# Patient Record
Sex: FEMALE | URBAN - METROPOLITAN AREA
[De-identification: names, ages, dates, MRNs, and addresses within clinical notes are randomized per-mention and may not be internally consistent; named-entity substitution may affect disease eponyms.]

---

## 2021-05-27 ENCOUNTER — RECORDS - HEALTHEAST (OUTPATIENT)
Dept: ADMINISTRATIVE | Facility: CLINIC | Age: 71
End: 2021-05-27

## 2024-08-09 ENCOUNTER — HOSPITAL ENCOUNTER (OUTPATIENT)
Facility: CLINIC | Age: 74
End: 2024-08-09
Attending: OBSTETRICS & GYNECOLOGY | Admitting: OBSTETRICS & GYNECOLOGY
Payer: MEDICARE

## 2024-10-01 ENCOUNTER — LAB REQUISITION (OUTPATIENT)
Dept: LAB | Facility: CLINIC | Age: 74
End: 2024-10-01
Payer: MEDICARE

## 2024-10-01 DIAGNOSIS — N84.1 POLYP OF CERVIX UTERI: ICD-10-CM

## 2024-10-01 DIAGNOSIS — Z12.4 ENCOUNTER FOR SCREENING FOR MALIGNANT NEOPLASM OF CERVIX: ICD-10-CM

## 2024-10-01 PROCEDURE — 87624 HPV HI-RISK TYP POOLED RSLT: CPT | Mod: ORL | Performed by: OBSTETRICS & GYNECOLOGY

## 2024-10-01 PROCEDURE — G0145 SCR C/V CYTO,THINLAYER,RESCR: HCPCS | Mod: ORL | Performed by: OBSTETRICS & GYNECOLOGY

## 2024-10-01 PROCEDURE — 88305 TISSUE EXAM BY PATHOLOGIST: CPT | Mod: TC,ORL | Performed by: OBSTETRICS & GYNECOLOGY

## 2024-10-02 LAB
HPV HR 12 DNA CVX QL NAA+PROBE: NEGATIVE
HPV16 DNA CVX QL NAA+PROBE: NEGATIVE
HPV18 DNA CVX QL NAA+PROBE: NEGATIVE
HUMAN PAPILLOMA VIRUS FINAL DIAGNOSIS: NORMAL

## 2024-10-03 LAB
PATH REPORT.COMMENTS IMP SPEC: NORMAL
PATH REPORT.COMMENTS IMP SPEC: NORMAL
PATH REPORT.FINAL DX SPEC: NORMAL
PATH REPORT.GROSS SPEC: NORMAL
PATH REPORT.MICROSCOPIC SPEC OTHER STN: NORMAL
PATH REPORT.RELEVANT HX SPEC: NORMAL
PHOTO IMAGE: NORMAL

## 2024-10-03 PROCEDURE — 88305 TISSUE EXAM BY PATHOLOGIST: CPT | Mod: 26 | Performed by: STUDENT IN AN ORGANIZED HEALTH CARE EDUCATION/TRAINING PROGRAM

## 2024-10-04 LAB
BKR AP ASSOCIATED HPV REPORT: NORMAL
BKR LAB AP GYN ADEQUACY: NORMAL
BKR LAB AP GYN INTERPRETATION: NORMAL
BKR LAB AP LMP: NORMAL
BKR LAB AP PREVIOUS ABNL DX: NORMAL
BKR LAB AP PREVIOUS ABNORMAL: NORMAL
PATH REPORT.COMMENTS IMP SPEC: NORMAL
PATH REPORT.COMMENTS IMP SPEC: NORMAL
PATH REPORT.RELEVANT HX SPEC: NORMAL

## 2024-12-08 RX ORDER — ACETAMINOPHEN 325 MG/1
975 TABLET ORAL ONCE
Status: CANCELLED | OUTPATIENT
Start: 2024-12-08 | End: 2024-12-08

## 2024-12-08 RX ORDER — CEFAZOLIN SODIUM/WATER 2 G/20 ML
2 SYRINGE (ML) INTRAVENOUS SEE ADMIN INSTRUCTIONS
Status: CANCELLED | OUTPATIENT
Start: 2024-12-08

## 2024-12-08 RX ORDER — CEFAZOLIN SODIUM/WATER 2 G/20 ML
2 SYRINGE (ML) INTRAVENOUS
Status: CANCELLED | OUTPATIENT
Start: 2024-12-08

## 2025-01-16 NOTE — H&P (VIEW-ONLY)
Pre-Operative Assessment        1/16/2025   Pre-Op Assessment Procedure Details   Procedure Prolapsed bladder, hysterectomy   Surgeon Dr. Abdullahi   Location Other   Other Location Name [facility]   Procedure Date 1/30/2025        Tony Umana is a 74 y.o. old female here for pre-operative evaluation for procedure noted above.     Patient has had nearly 1 years history of pelvic/vaginal bulge sensation. Since fall of 2024 she has sought UroGyne/women's health consultation. Part of this evaluation did include pelvic ultrasound, urodynamic testing with recommendation to complete laparoscopic supracervical hysterectomy, robot assisted, sacral colpoperineopexy, bilateral salpingo oophorectomy, and tension-free vaginal tape.    history of remote breast cancer 20+ years ago; this was treated s/p bilateral mastectomy.  Patient also has a history of hypertension currently on losartan and HCTZ regimen.  History of left TKA 05/26/2023.     Family history of malignant neoplasm of the GI tract; patient's last colonoscopy 12/2022 with advised 3 year repeat.    Patient Active Problem List    Diagnosis Date Noted     Cystocele with uterine prolapse 04/22/2024     H/O adenomatous polyp of colon 05/12/2023     Overview Note:     Last scope 2022 w/tubular adenoma.       Essential hypertension, benign ([facility]) 05/12/2023     Stiffness of finger joint of right hand 05/28/2021     Hypertrophic burn scar 05/28/2021     Mild intermittent asthma without complication ([facility]) 02/23/2016     Family history of malignant neoplasm of gastrointestinal tract 06/07/2007     History of breast cancer 02/15/2007     Overview Note:     2003    S/p elysia mastectomy.  no chemo or radiation        Past Medical History:   Diagnosis Date     Arthritis      Asthma      Breast cancer ([facility])      Hypertension ([facility])       Past Surgical History:   Procedure Laterality Date     CHOLECYSTECTOMY       MASTECTOMY Bilateral       Current Outpatient Medications    Medication Instructions     albuterol 2.5 mg/3 mL (0.083%) (PROVENTIL) 2.5 mg, Inhalation, Q6H PRN     ALBUterol sulfate  (90 Base) MCG/ACT inhaler 1-2 Puffs, Inhalation, Q4H PRN     Cholecalciferol (VITAMIN D-3 OR) 5,000 Units, Oral, DAILY     drug not in computer Ligaplex !!-one capsule daily     fluticasone (FLOVENT HFA) 110 mcg/actuation inhaler 1 Puff, BID     hydroCHLOROthiazide (ORETIC) 25 mg, Oral, DAILY     loratadine (CLARITIN) 10 mg, Oral, DAILY     losartan (COZAAR) 25 mg, Oral, DAILY     zinc gluconate 50 mg, Oral, DAILY     Allergies   Allergen Reactions     Latex Unknown     Atenolol Unknown     Azithromycin Gastrointestinal     Lisinopril Unknown     Social History     Occupational History     Not on file   Tobacco Use     Smoking status: Never     Smokeless tobacco: Never   Vaping Use     Vaping status: Never Used   Substance and Sexual Activity     Alcohol use: Not Currently     Drug use: Not on file     Sexual activity: Not on file       No LMP recorded.    Family History   Problem Relation Name Age of Onset     Cancer, Colon Birth Father       Review of Systems:        1/16/2025   ET Amb PreOp Assessment Sx   Have you had a heart attack in the last 30 days? No   Have you experienced chest tightening or chest pressure with activity? No   Do you wake at night with difficulty breathing? No   Do you have swelling in your feet or ankles? No   Do you get short of breath if lying flat at night? No   Do you hear wheezing or whistling when you breathe? No   Have you had a cough, runny nose, or cold symptoms in the last 2 weeks? No   Have you tested positive for Covid in the last 6 months? No   Do you have a long-standing cough? No   Do you snore or are you sleepy during the day? No   Do you have any symptoms due to a recent concussion? No   Do you or close relatives have bleeding or clotting problems? No   Have you taken Aspirin, Ibuprofen (Advil) or Naproxen (Aleve) in the last 7 days? No   Do  you or close relatives have a history of a severe or life-threatening reaction to anesthesia? No        Estimated Functional Capacity  Can you climb one flight of stairs, or walk up a gradual uphill without stopping? yes, functional capacity is more than or equal to 4 METS    Objective   /83 (BP Location: Right Arm, BP Cuff Size: Large)   Pulse 64   Temp 97.9  F (36.6  C) (Temporal Artery)   Resp 16   Wt 214 lb (97.1 kg)   SpO2 98%     Physical Exam:  General Appearance: alert, well appearing, and in no apparent distress  Eyes:  sclera clear and conjunctiva normal  ENT:  oropharynx clear, ear canals clear, TMs normal, and Mallampati III  Neck:  no lymphadenopathy, no thyromegaly or nodules, and carotids pulses normal and without bruits  Heart:  regular rate and rhythm and no murmurs, gallops or rubs  Lungs:  clear to auscultation and no wheezes, rales or rhonchi  Abdomen:  soft, nontender, no palpable masses, and normal bowel sounds  Extremities:  no edema and normal pedal pulses  Skin:  no rashes or worrisome lesions  Neurologic:  normal speech, no facial droop, alert and oriented x 3, and normal gait    Data:      Labs: Yes:   Sodium   Date Value Ref Range Status   01/16/2025 137 136 - 145 mmol/L Final     Potassium   Date Value Ref Range Status   01/16/2025 3.9 3.5 - 5.1 mmol/L Final     Creatinine   Date Value Ref Range Status   01/16/2025 0.80 0.55 - 1.02 mg/dL Final     Hemoglobin   Date Value Ref Range Status   01/16/2025 12.7 12.0 - 15.5 g/dL Final     ECG: Today: 1/16/2025.   Sinus rhythm with sinus arrhythmia  Minimal voltage criteria for LVH, may be normal variant  Borderline ECG  No previous ECGs available     Assessment/Plan   Patient is medically optimized for planned procedure(s).      ICD-10-CM    1. Preop general physical exam  Z01.818 EKG Reading/Trace     Sodium     Potassium     Creatinine / GFR     Complete Blood Count-No Diff     Sodium     Potassium     Creatinine / GFR     Complete  Blood Count-No Diff      2. Female genital prolapse, unspecified type  N81.9       3. Uterovaginal prolapse  N81.4       4. Female stress incontinence  N39.3         Special risks:  Mild asthma history  Hypertension in which her antihypertensives will be held the day of the procedure    Medication recommendations:    Patient Instructions   losartan (COZAAR) 25 MG tablet [[id number]]  -  Do not take on the morning of procedure.     hydroCHLOROthiazide (ORETIC) 25 MG tablet [[id number]]  -  Do not take on the morning of procedure.     Follow your individualized medication recommendations as described above.    Stop ibuprofen 1 day before surgery  Stop naproxen (Aleve) 4 days before surgery  Stop aspirin 7 days before surgery  Stop vitamins, herbal supplements 14 days prior to surgery (vitamin D, zinc & your Ligaplex)    Continue all other medications as currently taking--albuterol neb or inhaler as needed, Flovent inhaler as scheduled, and loratadine (Claritin).   Let your care team know if you have questions.    On the day of your procedure, do not wear any hair product including hair sprays and gels and avoid using body sprays and deodorants/antiperspirants.    Bring with you to the site of the procedure:    Any oral appliances or CPAP equipment related to sleep apnea  Any other health-related equipment or devices you use daily    Electronically signed by: SUDEEP Tejada, CNP  1/16/2025, 3:05 PM

## 2025-01-27 RX ORDER — VITAMIN B COMPLEX
1 TABLET ORAL DAILY
Status: ON HOLD | COMMUNITY
Start: 2023-05-12

## 2025-01-27 RX ORDER — LOSARTAN POTASSIUM 25 MG/1
1 TABLET ORAL DAILY
Status: ON HOLD | COMMUNITY
Start: 2024-05-31

## 2025-01-27 RX ORDER — HYDROCHLOROTHIAZIDE 25 MG/1
1 TABLET ORAL DAILY
Status: ON HOLD | COMMUNITY
Start: 2024-05-31

## 2025-01-27 RX ORDER — LORATADINE 10 MG/1
10 TABLET ORAL DAILY
Status: ON HOLD | COMMUNITY

## 2025-01-30 ENCOUNTER — ANESTHESIA (OUTPATIENT)
Dept: SURGERY | Facility: CLINIC | Age: 75
End: 2025-01-30
Payer: MEDICARE

## 2025-01-30 ENCOUNTER — ANESTHESIA EVENT (OUTPATIENT)
Dept: SURGERY | Facility: CLINIC | Age: 75
End: 2025-01-30
Payer: MEDICARE

## 2025-01-30 ENCOUNTER — HOSPITAL ENCOUNTER (OUTPATIENT)
Facility: CLINIC | Age: 75
End: 2025-01-30
Attending: OBSTETRICS & GYNECOLOGY | Admitting: OBSTETRICS & GYNECOLOGY
Payer: MEDICARE

## 2025-01-30 VITALS
TEMPERATURE: 98.7 F | DIASTOLIC BLOOD PRESSURE: 72 MMHG | OXYGEN SATURATION: 96 % | HEART RATE: 80 BPM | RESPIRATION RATE: 16 BRPM | WEIGHT: 214 LBS | HEIGHT: 63 IN | SYSTOLIC BLOOD PRESSURE: 153 MMHG | BODY MASS INDEX: 37.92 KG/M2

## 2025-01-30 DIAGNOSIS — Z90.710 H/O: HYSTERECTOMY: Primary | ICD-10-CM

## 2025-01-30 LAB
ABO + RH BLD: NORMAL
BASOPHILS # BLD AUTO: 0 10E3/UL (ref 0–0.2)
BASOPHILS NFR BLD AUTO: 0 %
BLD GP AB SCN SERPL QL: NEGATIVE
EOSINOPHIL # BLD AUTO: 0.3 10E3/UL (ref 0–0.7)
EOSINOPHIL NFR BLD AUTO: 4 %
ERYTHROCYTE [DISTWIDTH] IN BLOOD BY AUTOMATED COUNT: 14 % (ref 10–15)
HCT VFR BLD AUTO: 37.7 % (ref 35–47)
HGB BLD-MCNC: 12.4 G/DL (ref 11.7–15.7)
IMM GRANULOCYTES # BLD: 0 10E3/UL
IMM GRANULOCYTES NFR BLD: 0 %
LYMPHOCYTES # BLD AUTO: 3.5 10E3/UL (ref 0.8–5.3)
LYMPHOCYTES NFR BLD AUTO: 40 %
MCH RBC QN AUTO: 28.8 PG (ref 26.5–33)
MCHC RBC AUTO-ENTMCNC: 32.9 G/DL (ref 31.5–36.5)
MCV RBC AUTO: 88 FL (ref 78–100)
MONOCYTES # BLD AUTO: 0.8 10E3/UL (ref 0–1.3)
MONOCYTES NFR BLD AUTO: 9 %
NEUTROPHILS # BLD AUTO: 4.1 10E3/UL (ref 1.6–8.3)
NEUTROPHILS NFR BLD AUTO: 47 %
NRBC # BLD AUTO: 0 10E3/UL
NRBC BLD AUTO-RTO: 0 /100
PLATELET # BLD AUTO: 316 10E3/UL (ref 150–450)
RBC # BLD AUTO: 4.31 10E6/UL (ref 3.8–5.2)
SPECIMEN EXP DATE BLD: NORMAL
WBC # BLD AUTO: 8.8 10E3/UL (ref 4–11)

## 2025-01-30 PROCEDURE — 250N000011 HC RX IP 250 OP 636: Performed by: OBSTETRICS & GYNECOLOGY

## 2025-01-30 PROCEDURE — 85018 HEMOGLOBIN: CPT | Performed by: NURSE PRACTITIONER

## 2025-01-30 PROCEDURE — 86850 RBC ANTIBODY SCREEN: CPT | Performed by: NURSE PRACTITIONER

## 2025-01-30 PROCEDURE — 250N000011 HC RX IP 250 OP 636: Performed by: NURSE ANESTHETIST, CERTIFIED REGISTERED

## 2025-01-30 PROCEDURE — 258N000003 HC RX IP 258 OP 636: Performed by: ANESTHESIOLOGY

## 2025-01-30 PROCEDURE — 250N000009 HC RX 250: Performed by: NURSE ANESTHETIST, CERTIFIED REGISTERED

## 2025-01-30 PROCEDURE — 250N000009 HC RX 250: Performed by: OBSTETRICS & GYNECOLOGY

## 2025-01-30 PROCEDURE — 99203 OFFICE O/P NEW LOW 30 MIN: CPT | Performed by: EMERGENCY MEDICINE

## 2025-01-30 PROCEDURE — 250N000013 HC RX MED GY IP 250 OP 250 PS 637: Performed by: OBSTETRICS & GYNECOLOGY

## 2025-01-30 PROCEDURE — C1771 REP DEV, URINARY, W/SLING: HCPCS | Performed by: OBSTETRICS & GYNECOLOGY

## 2025-01-30 PROCEDURE — 272N000001 HC OR GENERAL SUPPLY STERILE: Performed by: OBSTETRICS & GYNECOLOGY

## 2025-01-30 PROCEDURE — 360N000080 HC SURGERY LEVEL 7, PER MIN: Performed by: OBSTETRICS & GYNECOLOGY

## 2025-01-30 PROCEDURE — 258N000001 HC RX 258: Performed by: OBSTETRICS & GYNECOLOGY

## 2025-01-30 PROCEDURE — 370N000017 HC ANESTHESIA TECHNICAL FEE, PER MIN: Performed by: OBSTETRICS & GYNECOLOGY

## 2025-01-30 PROCEDURE — 250N000011 HC RX IP 250 OP 636: Performed by: NURSE PRACTITIONER

## 2025-01-30 PROCEDURE — 85004 AUTOMATED DIFF WBC COUNT: CPT | Performed by: NURSE PRACTITIONER

## 2025-01-30 PROCEDURE — 250N000025 HC SEVOFLURANE, PER MIN: Performed by: OBSTETRICS & GYNECOLOGY

## 2025-01-30 PROCEDURE — 36415 COLL VENOUS BLD VENIPUNCTURE: CPT | Performed by: NURSE PRACTITIONER

## 2025-01-30 PROCEDURE — 999N000141 HC STATISTIC PRE-PROCEDURE NURSING ASSESSMENT: Performed by: OBSTETRICS & GYNECOLOGY

## 2025-01-30 PROCEDURE — 86900 BLOOD TYPING SEROLOGIC ABO: CPT | Performed by: NURSE PRACTITIONER

## 2025-01-30 PROCEDURE — C1781 MESH (IMPLANTABLE): HCPCS | Performed by: OBSTETRICS & GYNECOLOGY

## 2025-01-30 PROCEDURE — 88302 TISSUE EXAM BY PATHOLOGIST: CPT | Mod: TC | Performed by: OBSTETRICS & GYNECOLOGY

## 2025-01-30 PROCEDURE — 258N000003 HC RX IP 258 OP 636: Performed by: NURSE ANESTHETIST, CERTIFIED REGISTERED

## 2025-01-30 PROCEDURE — 710N000010 HC RECOVERY PHASE 1, LEVEL 2, PER MIN: Performed by: OBSTETRICS & GYNECOLOGY

## 2025-01-30 PROCEDURE — 250N000013 HC RX MED GY IP 250 OP 250 PS 637: Performed by: NURSE PRACTITIONER

## 2025-01-30 PROCEDURE — 85041 AUTOMATED RBC COUNT: CPT | Performed by: NURSE PRACTITIONER

## 2025-01-30 DEVICE — IMPLANTABLE DEVICE: Type: IMPLANTABLE DEVICE | Site: VAGINA | Status: FUNCTIONAL

## 2025-01-30 DEVICE — POLYPROPYLENE MESH FOR SACROCOLPOSUSPENSION/SACROCOLPOPEXY - Y CONTOUR™
Type: IMPLANTABLE DEVICE | Site: ABDOMEN | Status: FUNCTIONAL
Brand: RESTORELLE

## 2025-01-30 RX ORDER — FENTANYL CITRATE 50 UG/ML
25 INJECTION, SOLUTION INTRAMUSCULAR; INTRAVENOUS EVERY 5 MIN PRN
Status: DISCONTINUED | OUTPATIENT
Start: 2025-01-30 | End: 2025-01-30 | Stop reason: HOSPADM

## 2025-01-30 RX ORDER — AMOXICILLIN 250 MG
1-2 CAPSULE ORAL 2 TIMES DAILY
Qty: 30 TABLET | Refills: 0 | Status: SHIPPED | OUTPATIENT
Start: 2025-01-30

## 2025-01-30 RX ORDER — AMOXICILLIN 250 MG
1 CAPSULE ORAL 2 TIMES DAILY
Status: DISPENSED | OUTPATIENT
Start: 2025-01-30

## 2025-01-30 RX ORDER — OXYCODONE HYDROCHLORIDE 5 MG/1
5 TABLET ORAL EVERY 4 HOURS PRN
Status: ACTIVE | OUTPATIENT
Start: 2025-01-30

## 2025-01-30 RX ORDER — SODIUM CHLORIDE, SODIUM LACTATE, POTASSIUM CHLORIDE, CALCIUM CHLORIDE 600; 310; 30; 20 MG/100ML; MG/100ML; MG/100ML; MG/100ML
INJECTION, SOLUTION INTRAVENOUS CONTINUOUS
Status: DISCONTINUED | OUTPATIENT
Start: 2025-01-30 | End: 2025-01-30 | Stop reason: HOSPADM

## 2025-01-30 RX ORDER — ACETAMINOPHEN 325 MG/1
975 TABLET ORAL ONCE
Status: COMPLETED | OUTPATIENT
Start: 2025-01-30 | End: 2025-01-30

## 2025-01-30 RX ORDER — LIDOCAINE HYDROCHLORIDE 10 MG/ML
INJECTION, SOLUTION INFILTRATION; PERINEURAL PRN
Status: DISCONTINUED | OUTPATIENT
Start: 2025-01-30 | End: 2025-01-30

## 2025-01-30 RX ORDER — ONDANSETRON 2 MG/ML
4 INJECTION INTRAMUSCULAR; INTRAVENOUS EVERY 30 MIN PRN
Status: DISCONTINUED | OUTPATIENT
Start: 2025-01-30 | End: 2025-01-30 | Stop reason: HOSPADM

## 2025-01-30 RX ORDER — NALOXONE HYDROCHLORIDE 0.4 MG/ML
0.4 INJECTION, SOLUTION INTRAMUSCULAR; INTRAVENOUS; SUBCUTANEOUS
Status: ACTIVE | OUTPATIENT
Start: 2025-01-30

## 2025-01-30 RX ORDER — NALOXONE HYDROCHLORIDE 0.4 MG/ML
0.1 INJECTION, SOLUTION INTRAMUSCULAR; INTRAVENOUS; SUBCUTANEOUS
Status: DISCONTINUED | OUTPATIENT
Start: 2025-01-30 | End: 2025-01-30 | Stop reason: HOSPADM

## 2025-01-30 RX ORDER — HYDROMORPHONE HCL IN WATER/PF 6 MG/30 ML
0.2 PATIENT CONTROLLED ANALGESIA SYRINGE INTRAVENOUS EVERY 5 MIN PRN
Status: DISCONTINUED | OUTPATIENT
Start: 2025-01-30 | End: 2025-01-30 | Stop reason: HOSPADM

## 2025-01-30 RX ORDER — ZINC SULFATE 50(220)MG
220 CAPSULE ORAL DAILY
Status: ON HOLD | COMMUNITY

## 2025-01-30 RX ORDER — BUPIVACAINE HYDROCHLORIDE 2.5 MG/ML
INJECTION, SOLUTION INFILTRATION; PERINEURAL
Status: DISCONTINUED
Start: 2025-01-30 | End: 2025-01-30 | Stop reason: HOSPADM

## 2025-01-30 RX ORDER — PROCHLORPERAZINE MALEATE 5 MG/1
5 TABLET ORAL EVERY 6 HOURS PRN
Status: ACTIVE | OUTPATIENT
Start: 2025-01-30

## 2025-01-30 RX ORDER — OXYCODONE HYDROCHLORIDE 5 MG/1
10 TABLET ORAL EVERY 4 HOURS PRN
Status: ACTIVE | OUTPATIENT
Start: 2025-01-30

## 2025-01-30 RX ORDER — ONDANSETRON 4 MG/1
4-8 TABLET, ORALLY DISINTEGRATING ORAL EVERY 8 HOURS PRN
Qty: 4 TABLET | Refills: 0 | Status: SHIPPED | OUTPATIENT
Start: 2025-01-30

## 2025-01-30 RX ORDER — NALOXONE HYDROCHLORIDE 0.4 MG/ML
0.2 INJECTION, SOLUTION INTRAMUSCULAR; INTRAVENOUS; SUBCUTANEOUS
Status: ACTIVE | OUTPATIENT
Start: 2025-01-30

## 2025-01-30 RX ORDER — KETOROLAC TROMETHAMINE 15 MG/ML
15 INJECTION, SOLUTION INTRAMUSCULAR; INTRAVENOUS EVERY 6 HOURS
Status: ACTIVE | OUTPATIENT
Start: 2025-01-31 | End: 2025-02-05

## 2025-01-30 RX ORDER — ONDANSETRON 2 MG/ML
INJECTION INTRAMUSCULAR; INTRAVENOUS PRN
Status: DISCONTINUED | OUTPATIENT
Start: 2025-01-30 | End: 2025-01-30

## 2025-01-30 RX ORDER — POLYETHYLENE GLYCOL 3350 17 G/17G
17 POWDER, FOR SOLUTION ORAL DAILY PRN
Status: ACTIVE | OUTPATIENT
Start: 2025-01-31

## 2025-01-30 RX ORDER — FENTANYL CITRATE 50 UG/ML
50 INJECTION, SOLUTION INTRAMUSCULAR; INTRAVENOUS EVERY 5 MIN PRN
Status: DISCONTINUED | OUTPATIENT
Start: 2025-01-30 | End: 2025-01-30 | Stop reason: HOSPADM

## 2025-01-30 RX ORDER — LOSARTAN POTASSIUM 25 MG/1
25 TABLET ORAL DAILY
Status: ACTIVE | OUTPATIENT
Start: 2025-01-31

## 2025-01-30 RX ORDER — HYDROMORPHONE HCL IN WATER/PF 6 MG/30 ML
0.2 PATIENT CONTROLLED ANALGESIA SYRINGE INTRAVENOUS EVERY 4 HOURS PRN
Status: DISCONTINUED | OUTPATIENT
Start: 2025-01-30 | End: 2025-01-30

## 2025-01-30 RX ORDER — DEXAMETHASONE SODIUM PHOSPHATE 4 MG/ML
4 INJECTION, SOLUTION INTRA-ARTICULAR; INTRALESIONAL; INTRAMUSCULAR; INTRAVENOUS; SOFT TISSUE
Status: DISCONTINUED | OUTPATIENT
Start: 2025-01-30 | End: 2025-01-30 | Stop reason: HOSPADM

## 2025-01-30 RX ORDER — ACETAMINOPHEN 325 MG/1
650 TABLET ORAL EVERY 6 HOURS
Status: ACTIVE | OUTPATIENT
Start: 2025-02-03

## 2025-01-30 RX ORDER — HYDROMORPHONE HYDROCHLORIDE 1 MG/ML
0.5 INJECTION, SOLUTION INTRAMUSCULAR; INTRAVENOUS; SUBCUTANEOUS EVERY 4 HOURS PRN
Status: ACTIVE | OUTPATIENT
Start: 2025-01-30

## 2025-01-30 RX ORDER — CEFAZOLIN SODIUM/WATER 2 G/20 ML
2 SYRINGE (ML) INTRAVENOUS
Status: COMPLETED | OUTPATIENT
Start: 2025-01-30 | End: 2025-01-30

## 2025-01-30 RX ORDER — OXYCODONE HYDROCHLORIDE 5 MG/1
5-10 TABLET ORAL EVERY 4 HOURS PRN
Qty: 12 TABLET | Refills: 0 | Status: SHIPPED | OUTPATIENT
Start: 2025-01-30

## 2025-01-30 RX ORDER — HYDROXYZINE HYDROCHLORIDE 10 MG/1
10 TABLET, FILM COATED ORAL EVERY 6 HOURS PRN
Status: ACTIVE | OUTPATIENT
Start: 2025-01-30

## 2025-01-30 RX ORDER — GLYCOPYRROLATE 0.2 MG/ML
INJECTION, SOLUTION INTRAMUSCULAR; INTRAVENOUS PRN
Status: DISCONTINUED | OUTPATIENT
Start: 2025-01-30 | End: 2025-01-30

## 2025-01-30 RX ORDER — IBUPROFEN 600 MG/1
600 TABLET, FILM COATED ORAL EVERY 6 HOURS
Status: ACTIVE | OUTPATIENT
Start: 2025-01-31 | End: 2025-02-05

## 2025-01-30 RX ORDER — FENTANYL CITRATE 50 UG/ML
INJECTION, SOLUTION INTRAMUSCULAR; INTRAVENOUS PRN
Status: DISCONTINUED | OUTPATIENT
Start: 2025-01-30 | End: 2025-01-30

## 2025-01-30 RX ORDER — LIDOCAINE 40 MG/G
CREAM TOPICAL
Status: ACTIVE | OUTPATIENT
Start: 2025-01-30

## 2025-01-30 RX ORDER — ONDANSETRON 2 MG/ML
4 INJECTION INTRAMUSCULAR; INTRAVENOUS EVERY 6 HOURS PRN
Status: ACTIVE | OUTPATIENT
Start: 2025-01-30

## 2025-01-30 RX ORDER — FUROSEMIDE 10 MG/ML
INJECTION INTRAMUSCULAR; INTRAVENOUS PRN
Status: DISCONTINUED | OUTPATIENT
Start: 2025-01-30 | End: 2025-01-30

## 2025-01-30 RX ORDER — PROPOFOL 10 MG/ML
INJECTION, EMULSION INTRAVENOUS PRN
Status: DISCONTINUED | OUTPATIENT
Start: 2025-01-30 | End: 2025-01-30

## 2025-01-30 RX ORDER — LIDOCAINE HYDROCHLORIDE AND EPINEPHRINE 10; 10 MG/ML; UG/ML
INJECTION, SOLUTION INFILTRATION; PERINEURAL PRN
Status: DISCONTINUED | OUTPATIENT
Start: 2025-01-30 | End: 2025-01-30 | Stop reason: HOSPADM

## 2025-01-30 RX ORDER — HYDROMORPHONE HCL IN WATER/PF 6 MG/30 ML
0.4 PATIENT CONTROLLED ANALGESIA SYRINGE INTRAVENOUS EVERY 5 MIN PRN
Status: DISCONTINUED | OUTPATIENT
Start: 2025-01-30 | End: 2025-01-30 | Stop reason: HOSPADM

## 2025-01-30 RX ORDER — ACETAMINOPHEN 325 MG/1
975 TABLET ORAL EVERY 6 HOURS
Status: ACTIVE | OUTPATIENT
Start: 2025-01-31 | End: 2025-02-03

## 2025-01-30 RX ORDER — ONDANSETRON 4 MG/1
4 TABLET, ORALLY DISINTEGRATING ORAL EVERY 30 MIN PRN
Status: DISCONTINUED | OUTPATIENT
Start: 2025-01-30 | End: 2025-01-30 | Stop reason: HOSPADM

## 2025-01-30 RX ORDER — LIDOCAINE HYDROCHLORIDE AND EPINEPHRINE 10; 10 MG/ML; UG/ML
INJECTION, SOLUTION INFILTRATION; PERINEURAL
Status: DISCONTINUED
Start: 2025-01-30 | End: 2025-01-30 | Stop reason: HOSPADM

## 2025-01-30 RX ORDER — LORATADINE 10 MG/1
10 TABLET ORAL DAILY
Status: ACTIVE | OUTPATIENT
Start: 2025-01-31

## 2025-01-30 RX ORDER — HYDROCHLOROTHIAZIDE 25 MG/1
25 TABLET ORAL DAILY
Status: ACTIVE | OUTPATIENT
Start: 2025-02-01

## 2025-01-30 RX ORDER — PROPOFOL 10 MG/ML
INJECTION, EMULSION INTRAVENOUS CONTINUOUS PRN
Status: DISCONTINUED | OUTPATIENT
Start: 2025-01-30 | End: 2025-01-30

## 2025-01-30 RX ORDER — BISACODYL 10 MG
10 SUPPOSITORY, RECTAL RECTAL DAILY PRN
Status: ACTIVE | OUTPATIENT
Start: 2025-01-30

## 2025-01-30 RX ORDER — SODIUM PHOSPHATE,MONO-DIBASIC 19G-7G/118
1 ENEMA (ML) RECTAL
Status: ACTIVE | OUTPATIENT
Start: 2025-01-30

## 2025-01-30 RX ORDER — ACETAMINOPHEN 325 MG/1
975 TABLET ORAL EVERY 6 HOURS PRN
Qty: 50 TABLET | Refills: 0 | Status: SHIPPED | OUTPATIENT
Start: 2025-01-30

## 2025-01-30 RX ORDER — ONDANSETRON 4 MG/1
4 TABLET, ORALLY DISINTEGRATING ORAL EVERY 6 HOURS PRN
Status: ACTIVE | OUTPATIENT
Start: 2025-01-30

## 2025-01-30 RX ORDER — CEFAZOLIN SODIUM/WATER 2 G/20 ML
2 SYRINGE (ML) INTRAVENOUS SEE ADMIN INSTRUCTIONS
Status: DISCONTINUED | OUTPATIENT
Start: 2025-01-30 | End: 2025-01-30 | Stop reason: HOSPADM

## 2025-01-30 RX ORDER — DEXAMETHASONE SODIUM PHOSPHATE 10 MG/ML
INJECTION, SOLUTION INTRAMUSCULAR; INTRAVENOUS PRN
Status: DISCONTINUED | OUTPATIENT
Start: 2025-01-30 | End: 2025-01-30

## 2025-01-30 RX ORDER — HYDROMORPHONE HYDROCHLORIDE 1 MG/ML
0.3 INJECTION, SOLUTION INTRAMUSCULAR; INTRAVENOUS; SUBCUTANEOUS EVERY 4 HOURS PRN
Status: ACTIVE | OUTPATIENT
Start: 2025-01-30

## 2025-01-30 RX ORDER — METRONIDAZOLE 500 MG/100ML
500 INJECTION, SOLUTION INTRAVENOUS
Status: COMPLETED | OUTPATIENT
Start: 2025-01-30 | End: 2025-01-30

## 2025-01-30 RX ORDER — HYDROMORPHONE HCL IN WATER/PF 6 MG/30 ML
0.1 PATIENT CONTROLLED ANALGESIA SYRINGE INTRAVENOUS EVERY 4 HOURS PRN
Status: DISCONTINUED | OUTPATIENT
Start: 2025-01-30 | End: 2025-01-30

## 2025-01-30 RX ORDER — IBUPROFEN 600 MG/1
600 TABLET, FILM COATED ORAL EVERY 6 HOURS PRN
Qty: 30 TABLET | Refills: 0 | Status: SHIPPED | OUTPATIENT
Start: 2025-01-30

## 2025-01-30 RX ADMIN — DEXMEDETOMIDINE HYDROCHLORIDE 12 MCG: 100 INJECTION, SOLUTION INTRAVENOUS at 17:22

## 2025-01-30 RX ADMIN — MIDAZOLAM 2 MG: 1 INJECTION INTRAMUSCULAR; INTRAVENOUS at 14:26

## 2025-01-30 RX ADMIN — ROCURONIUM BROMIDE 20 MG: 10 INJECTION, SOLUTION INTRAVENOUS at 15:54

## 2025-01-30 RX ADMIN — ROCURONIUM BROMIDE 50 MG: 10 INJECTION, SOLUTION INTRAVENOUS at 14:35

## 2025-01-30 RX ADMIN — DEXMEDETOMIDINE HYDROCHLORIDE 8 MCG: 100 INJECTION, SOLUTION INTRAVENOUS at 16:01

## 2025-01-30 RX ADMIN — FENTANYL CITRATE 100 MCG: 50 INJECTION INTRAMUSCULAR; INTRAVENOUS at 14:35

## 2025-01-30 RX ADMIN — ONDANSETRON 4 MG: 2 INJECTION INTRAMUSCULAR; INTRAVENOUS at 16:48

## 2025-01-30 RX ADMIN — HYDROMORPHONE HYDROCHLORIDE 0.5 MG: 1 INJECTION, SOLUTION INTRAMUSCULAR; INTRAVENOUS; SUBCUTANEOUS at 16:22

## 2025-01-30 RX ADMIN — SODIUM CHLORIDE, POTASSIUM CHLORIDE, SODIUM LACTATE AND CALCIUM CHLORIDE: 600; 310; 30; 20 INJECTION, SOLUTION INTRAVENOUS at 17:26

## 2025-01-30 RX ADMIN — METRONIDAZOLE 500 MG: 5 INJECTION, SOLUTION INTRAVENOUS at 13:15

## 2025-01-30 RX ADMIN — LIDOCAINE HYDROCHLORIDE 5 ML: 10 INJECTION, SOLUTION INFILTRATION; PERINEURAL at 14:35

## 2025-01-30 RX ADMIN — Medication 2 G: at 14:26

## 2025-01-30 RX ADMIN — FLUORESCEIN SODIUM 1 ML: 100 INJECTION INTRAVENOUS at 17:06

## 2025-01-30 RX ADMIN — Medication 200 MG: at 17:28

## 2025-01-30 RX ADMIN — GLYCOPYRROLATE 0.2 MG: 0.2 INJECTION INTRAMUSCULAR; INTRAVENOUS at 14:59

## 2025-01-30 RX ADMIN — ACETAMINOPHEN 975 MG: 325 TABLET ORAL at 12:46

## 2025-01-30 RX ADMIN — HYDROMORPHONE HYDROCHLORIDE 0.5 MG: 1 INJECTION, SOLUTION INTRAMUSCULAR; INTRAVENOUS; SUBCUTANEOUS at 15:54

## 2025-01-30 RX ADMIN — DEXAMETHASONE SODIUM PHOSPHATE 4 MG: 10 INJECTION, SOLUTION INTRAMUSCULAR; INTRAVENOUS at 14:35

## 2025-01-30 RX ADMIN — DEXMEDETOMIDINE HYDROCHLORIDE 8 MCG: 100 INJECTION, SOLUTION INTRAVENOUS at 16:48

## 2025-01-30 RX ADMIN — SODIUM CHLORIDE, POTASSIUM CHLORIDE, SODIUM LACTATE AND CALCIUM CHLORIDE: 600; 310; 30; 20 INJECTION, SOLUTION INTRAVENOUS at 15:01

## 2025-01-30 RX ADMIN — FUROSEMIDE 5 MG: 10 INJECTION, SOLUTION INTRAVENOUS at 17:10

## 2025-01-30 RX ADMIN — SODIUM CHLORIDE, POTASSIUM CHLORIDE, SODIUM LACTATE AND CALCIUM CHLORIDE: 600; 310; 30; 20 INJECTION, SOLUTION INTRAVENOUS at 13:15

## 2025-01-30 RX ADMIN — PROPOFOL 200 MG: 10 INJECTION, EMULSION INTRAVENOUS at 14:35

## 2025-01-30 RX ADMIN — SENNOSIDES AND DOCUSATE SODIUM 1 TABLET: 50; 8.6 TABLET ORAL at 20:34

## 2025-01-30 RX ADMIN — PROPOFOL 50 MCG/KG/MIN: 10 INJECTION, EMULSION INTRAVENOUS at 14:35

## 2025-01-30 RX ADMIN — DEXMEDETOMIDINE HYDROCHLORIDE 12 MCG: 100 INJECTION, SOLUTION INTRAVENOUS at 16:22

## 2025-01-30 ASSESSMENT — ACTIVITIES OF DAILY LIVING (ADL)
ADLS_ACUITY_SCORE: 22
ADLS_ACUITY_SCORE: 15
ADLS_ACUITY_SCORE: 22
ADLS_ACUITY_SCORE: 15
ADLS_ACUITY_SCORE: 22
ADLS_ACUITY_SCORE: 15
ADLS_ACUITY_SCORE: 22

## 2025-01-30 NOTE — BRIEF OP NOTE
[facility]    Brief Operative Note    Pre-operative diagnosis:   Pelvic organ prolapse  Stress incontinence    Post-operative diagnosis   Pelvic organ prolapse  Stress incontinence    Procedure: ROBOTIC LAPAROSCOPIC SUPRACERVICAL HYSTERECTOMY BILATERAL SALPINGO OOPHORECTOMY, SACROCOLPOPEXY TENSION FREE VAGINAL TAPING, CYSTOSCOPY, Bilateral - Abdomen    Surgeon: Surgeons and Role:     * Dipika Abdullahi MD - Primary    Anesthesia: General     Estimated Blood Loss: 50 cc    Drains: None  Specimens:   ID Type Source Tests Collected by Time Destination   1 : Left fallopian tube Tissue Fallopian Tube, Left SURGICAL PATHOLOGY EXAM Dipika Abdullahi MD 1/30/2025  3:20 PM    2 : Right fallopian tube Tissue Fallopian Tube, Right SURGICAL PATHOLOGY EXAM Dipika Abdullahi MD 1/30/2025  3:22 PM    3 : Uterus, Bilateral ovaries Tissue Uterus SURGICAL PATHOLOGY EXAM Dipika Abdullahi MD 1/30/2025  4:48 PM      Findings:     Normal uterus, tubes and ovaries   Hemostasis the pedicles  Normal bladder.  Strong Efflux of green dye from bilateral ureteral orifices  Bowel adhesion to upper abdomen in the midline    Complications: None.    Implants:   Implant Name Type Inv. Item Serial No.  Lot No. LRB No. Used Action   SLING Y MESH RESTORELLE CONTOUR [id number] - [id number] Mesh SLING Y MESH RESTORELLE CONTOUR [id number]  COLOPLAST [id number]  1 Implanted   TVT Exact     [id number]  1 Implanted     Dipika Abdullahi MD, FACOG  (P) [phone]

## 2025-01-30 NOTE — INTERVAL H&P NOTE
"H+P Update     Doing well. No changes in history.      BP (!) 168/79   Pulse 64   Temp 97.3  F (36.3  C) (Core)   Resp 16   Ht 1.588 m (5' 2.5\")   Wt 97.1 kg (214 lb)   SpO2 95%   BMI 38.52 kg/m        NAD, AAO x 3  Abdomen soft, non tender    A/P: 74 year old with prolapse and female stress incontinence [N39.3] here for surgical management  -- Patient here with her partner and sister  -- Met with patient and discussed the risks of surgery including bleeding, infection, damage to pelvic organs and need for further surgery. Discussed possible risk of bladder injury and need for discharge with a davis catheter.   -- Questions answered  -- Consent signed  -- Abdomen marked  -- To OR for Procedure(s):  ROBOTIC LAPAROSCOPIC SUPRACERVICAL HYSTERECTOMY BILATERAL SALPINGO OOPHORECTOMY, SACROCOLPOPEXY TENSION FREE VAGINAL TAPING, CYSTOSCOPY     Dipika Abdullahi MD  (P) [phone]      "

## 2025-01-30 NOTE — PHARMACY-ADMISSION MEDICATION HISTORY
Pharmacist Admission Medication History    Admission medication history is complete. The information provided in this note is only as accurate as the sources available at the time of the update.    Information Source(s): Patient and CareEverywhere/SureScripts via in-person    Pertinent Information:     Allergies reviewed with patient and updates made in EHR: yes    Medication History Completed By: Katharina Doty, BCPS 1/30/2025 1:16 PM    PTA Med List   Medication Sig Last Dose/Taking    hydrochlorothiazide (HYDRODIURIL) 25 MG tablet Take 1 tablet by mouth daily. 1/29/2025 Morning    loratadine (CLARITIN) 10 MG tablet Take 10 mg by mouth daily. 1/30/2025 at  7:30 AM    losartan (COZAAR) 25 MG tablet Take 1 tablet by mouth daily. 1/30/2025 at  7:30 AM    Vitamin D3 (VITAMIN D-1000 MAX ST) 25 mcg (1000 units) tablet Take 1 tablet by mouth daily. 1/16/2025    zinc sulfate (ZINCATE) 220 (50 Zn) MG capsule Take 220 mg by mouth daily. 1/16/2025

## 2025-01-30 NOTE — ANESTHESIA PROCEDURE NOTES
Airway       Patient location during procedure: OR       Procedure Start/Stop Times: 1/30/2025 2:37 PM  Staff -        Anesthesiologist:  Alex Ashton MD       CRNA: Maliha Sam APRN CRNA       Performed By: CRNA  Consent for Airway        Urgency: elective  Indications and Patient Condition       Indications for airway management: stevenson-procedural       Induction type:intravenous       Mask difficulty assessment: 1 - vent by mask    Final Airway Details       Final airway type: endotracheal airway       Successful airway: ETT - single  Endotracheal Airway Details        ETT size (mm): 7.0       Cuffed: yes       Successful intubation technique: video laryngoscopy       VL Blade Size: Glidescope 3       Grade View of Cords: 1       Adjucts: stylet       Position: Right       Measured from: lips       Secured at (cm): 22       Bite block used: None    Post intubation assessment        Placement verified by: capnometry, equal breath sounds and chest rise        Number of attempts at approach: 1       Number of other approaches attempted: 0       Secured with: tape       Ease of procedure: easy       Dentition: Intact and Unchanged    Medication(s) Administered   Medication Administration Time: 1/30/2025 2:37 PM

## 2025-01-30 NOTE — ANESTHESIA CARE TRANSFER NOTE
Patient: Lyndsay Singh    Procedure: Procedure(s):  ROBOTIC LAPAROSCOPIC SUPRACERVICAL HYSTERECTOMY BILATERAL SALPINGO OOPHORECTOMY, SACROCOLPOPEXY TENSION FREE VAGINAL TAPING, CYSTOSCOPY       Diagnosis: Female stress incontinence [N39.3]  Diagnosis Additional Information: No value filed.    Anesthesia Type:   General     Note:    Oropharynx: oropharynx clear of all foreign objects and spontaneously breathing  Level of Consciousness: drowsy  Oxygen Supplementation: face mask  Level of Supplemental Oxygen (L/min / FiO2): 8  Independent Airway: airway patency satisfactory and stable  Dentition: dentition unchanged  Vital Signs Stable: post-procedure vital signs reviewed and stable  Report to RN Given: handoff report given  Patient transferred to: PACU    Handoff Report: Identifed the Patient, Identified the Reponsible Provider, Reviewed the pertinent medical history, Discussed the surgical course, Reviewed Intra-OP anesthesia mangement and issues during anesthesia, Set expectations for post-procedure period and Allowed opportunity for questions and acknowledgement of understanding      Vitals:  Vitals Value Taken Time   /70 01/30/25 1741   Temp 36.4  C (97.52  F) 01/30/25 1743   Pulse 66 01/30/25 1743   Resp 15 01/30/25 1743   SpO2 95 % 01/30/25 1743   Vitals shown include unfiled device data.    Electronically Signed By: SUDEEP Laurent CRNA  January 30, 2025  5:45 PM

## 2025-01-30 NOTE — ANESTHESIA PREPROCEDURE EVALUATION
Anesthesia Pre-Procedure Evaluation    Patient: Lyndsay Singh   MRN: [id number] : 1950        Procedure : Procedure(s):  ROBOTIC LAPAROSCOPIC SUPRACERVICAL HYSTERECTOMY BILATERAL SALPINGO OOPHORECTOMY, SACROCOLPOPEXY TENSION FREE VAGINAL TAPING, CYSTOSCOPY          Past Medical History:   Diagnosis Date    Hypertension       History reviewed. No pertinent surgical history.   Allergies   Allergen Reactions    Lisinopril Shortness Of Breath and Unknown    Other (Do Not Use) Rash and Blisters     Band-aides    Azithromycin GI Disturbance    Latex Rash and Unknown    Atenolol Other (See Comments) and Unknown      Social History     Tobacco Use    Smoking status: Never    Smokeless tobacco: Never   Substance Use Topics    Alcohol use: Never      Wt Readings from Last 1 Encounters:   25 97.1 kg (214 lb)        Anesthesia Evaluation            ROS/MED HX  ENT/Pulmonary:       Neurologic:       Cardiovascular:     (+)  hypertension- -   -  - -                                      METS/Exercise Tolerance:     Hematologic:       Musculoskeletal:       GI/Hepatic:       Renal/Genitourinary:       Endo:     (+)               Obesity,       Psychiatric/Substance Use:       Infectious Disease:       Malignancy:       Other:            Physical Exam    Airway        Mallampati: III   TM distance: > 3 FB   Neck ROM: full   Mouth opening: > 3 cm    Respiratory Devices and Support         Dental       (+) Minor Abnormalities - some fillings, tiny chips      Cardiovascular   cardiovascular exam normal          Pulmonary   pulmonary exam normal                OUTSIDE LABS:  CBC:   Lab Results   Component Value Date    WBC 8.8 2025    HGB 12.4 2025    HCT 37.7 2025     2025       Anesthesia Plan    ASA Status:  3       Anesthesia Type: General.     - Airway: ETT   Induction: Intravenous.      Techniques and Equipment:     - Airway: Video-Laryngoscope       Consents         "    Postoperative Care            Comments:               Alex Ashton MD    I have reviewed the pertinent notes and labs in the chart from the past 30 days and (re)examined the patient.  Any updates or changes from those notes are reflected in this note.    Clinically Significant Risk Factors Present on Admission                   # Hypertension: Home medication list includes antihypertensive(s)           # Obesity: Estimated body mass index is 38.52 kg/m  as calculated from the following:    Height as of this encounter: 1.588 m (5' 2.5\").    Weight as of this encounter: 97.1 kg (214 lb).                "

## 2025-01-30 NOTE — OP NOTE
Op Note    DATE OF SERVICE: 1/30/2025    PRE-OP DIAGNOSIS:    Uterovaginal prolapse   Urinary stress incontinence.     POST-OP DIAGNOSIS:   Uterovaginal prolapse   Urinary stress incontinence.    PROCEDURE:    Robotic laparoscopic supracervical hysterectomy and bilateral salpingo-oophorectomy  Sacral colpopexy.   Tension-free transvaginal tape placement   Cystoscopy.     SURGEON:  Dipika Abdullahi MD    ASSISTANT(S):  Sharlene Fuller     ANESTHESIA:  General    ESTIMATED BLOOD LOSS:  50 cc    COMPLICATIONS:  None    TISSUE REMOVED:    Uterus  Bilateral tubes and ovaries    DRAINS:  Baltazar    HISTORY OF PRESENT ILLNESS   This is a 74 year old female with symptomatic uterovaginal prolapse and stress incontinence. The risks, benefits, and alternatives to the procedure were discussed at length. She expressed understanding and wished to proceed.     OPERATIVE FINDINGS   Both ovaries were  normal. Uterus is normal in size, shape and contour. Ureters could be easily seen throughout their course. Upper abdomen had a bowel adhesion to the upper abdominal wall.    PROCEDURE NOTE   The patient was brought to the operating room and after induction of general anesthesia, was prepped and draped in the dorsal lithotomy position. A timeout was called and the patient and the procedure were verified. A Baltazar was placed. A ERICA catheter was then attached to the cervix. Attention was directed to the abdomen where a small supraumbilical incision was made. A 12 mm trocar and trocar sheath were inserted and a laparoscope was introduced with findings as noted above. Four additional incisions were made, 1 on the left and 3 on the right. Robotic trocars x 2, an 8 airseal and an assist port was placed. The robot was then docked.     I took my position at the console. The pelvis was inspected with findings as noted above.  Both ureters were identified bilaterally.    The fimbriated end of the left tube was grasped.  I serially clamped,  cauterized and cut up the mesosalpinx.  The tube was then clamped cauterized and cut.  The tube was removed under direct visualization.  The fimbriated end of the right tube was grasped.  I serially clamped, cauterized and cut up the tube.  The tube was then clamped, cauterized and cut.  The tube was removed under direct visualization.    The round ligaments were then cauterized, incised and the anterior and posterior leaves of the broad ligament were opened. The infundibulopelvic arteries were cauterized and ligated using an endoloop. The uterine suspensory ligaments were then cauterized and cut on each side and additional parametrial bites were then obtained. Bladder flap was created in the usual manner. Once the uterocervical neck was reached, the monopolar scissors was then used to detach the uterus from the cervix. The uterus was then placed into the upper abdomen. The cervix was oversewn using with an 0 Stratafix suture.    The ERICA  was then used to position the cervix properly so that anterior dissection could be carried down over the anterior vaginal wall for a distance of 5 cm using the monopolar scissors. Great care was used to avoid any injury to the bladder or ureters and these were constantly assessed and situational awareness was paid to each ureter throughout the course of the procedure. Posteriorly, in a similar manner, the peritoneum was taken down and an excellent plane was established between the adventitia and the posterior vaginal vault. This was also taken down for a distance of 5 cm.     Attention was then paid to the sacral promontory with the peritoneum overlying this area was opened, dissected with great care. Again, both ureters were identified. The aorta and the common iliacs were also identified. The median sacral artery was identified and cauterized where it lay and the adjacent fat was then gently dissected until the fascial layer of the sacral promontory was exposed. The peritoneum  between the sacral promontory and the previously dissected pelvic floor was opened in the paracolic gutter between the colon and the right ureter. The colpoplasty mesh was then appropriately trimmed. 0 Stratafix suture was then used and the mesh was attached to the cervix  anteriorly. A second 0 Stratafix suture was then used and the mesh was attached posteriorly. Excellent hemostasis was noted. The ERICA catheter was then reduced so that appropriate tension could be placed on the mesh.     The final portion of the mesh was then tacked to the sacral promontory using 4 interrupted 2-0 Clayton-Vincenzo sutures with excellent elevation of the cervix. The mesh was appropriately trimmed. Copious amounts of irrigation were used. Good hemostasis was noted. A 2-0 Statafix  suture was then used to run the peritoneum to completely cover the mesh. Again, copious amounts of irrigation were used.     At this point, the robot was undocked. There uterus and ovaries were morcellated. The morcellator was introduced into the supracervical port and with the camera in the right portion in the right trocar, the morcellator was used with multiple passes to deliver the specimen and submit it completely for pathologic examination. Good hemostasis was noted and the instruments were removed. The fascia was closed in the usual manner. Skin was closed with Monocryl.     Attention was then directed vaginally. Excellent support was noted. A weighted speculum was placed. Allis clamps were used to grasp the vaginal mucosa for a distance of 2 cm at the urethrovesical neck. This area was injected with 1% lidocaine with epinephrine and an incision was made between the tented Allis clamps. Metzenbaum dissection was then carried out laterally and retropubically. Once adequate dissection had occurred, the Baltazar was removed and then replaced with a stiff-arm trocar. The TVT exact was then introduced first on the patient's left side and then right side, and used  to perforate the genital fascia, traverse the retropubic space, and then perforate the abdominal fascia and skin approximately 3 cm lateral to the midline. This was done on both the patient's left and right sides. The Baltazar was removed. A 70 degree cystoscope was introduced.  No trauma to the bladder was noted. Both ureters were patent. There was strong efflux of green dye bilaterally. The cystoscope was removed. The bladder was drained. The Baltazar was replaced. A Domínguez scissors was placed between the mesh covering and the urethra, and the mesh covering was removed so that the TVT was left appropriately loose. Copious amounts of irrigation were used. Good hemostasis was noted. A 2-0 Vicryl was used to close the vaginal mucosa. The mesh was appropriately trimmed at the skin level and the skin was closed. Sponge and instrument counts were correct. The patient tolerated the procedure well.My assistant, Sharlene Fuller   was present and helped throughout the procedure.     Dipika Abdullahi MD

## 2025-01-31 VITALS
TEMPERATURE: 98.1 F | SYSTOLIC BLOOD PRESSURE: 124 MMHG | OXYGEN SATURATION: 97 % | HEIGHT: 63 IN | BODY MASS INDEX: 37.92 KG/M2 | HEART RATE: 98 BPM | DIASTOLIC BLOOD PRESSURE: 66 MMHG | WEIGHT: 214 LBS | RESPIRATION RATE: 17 BRPM

## 2025-01-31 LAB
GLUCOSE BLDC GLUCOMTR-MCNC: 136 MG/DL (ref 70–99)
HGB BLD-MCNC: 12.3 G/DL (ref 11.7–15.7)

## 2025-01-31 PROCEDURE — 85018 HEMOGLOBIN: CPT | Performed by: OBSTETRICS & GYNECOLOGY

## 2025-01-31 PROCEDURE — 250N000013 HC RX MED GY IP 250 OP 250 PS 637: Performed by: EMERGENCY MEDICINE

## 2025-01-31 PROCEDURE — 82962 GLUCOSE BLOOD TEST: CPT

## 2025-01-31 PROCEDURE — 36415 COLL VENOUS BLD VENIPUNCTURE: CPT | Performed by: OBSTETRICS & GYNECOLOGY

## 2025-01-31 PROCEDURE — 250N000013 HC RX MED GY IP 250 OP 250 PS 637

## 2025-01-31 PROCEDURE — 250N000013 HC RX MED GY IP 250 OP 250 PS 637: Performed by: OBSTETRICS & GYNECOLOGY

## 2025-01-31 RX ORDER — HYDROCHLOROTHIAZIDE 25 MG/1
25 TABLET ORAL DAILY
Status: DISCONTINUED | OUTPATIENT
Start: 2025-01-31 | End: 2025-01-31 | Stop reason: HOSPADM

## 2025-01-31 RX ADMIN — LORATADINE 10 MG: 10 TABLET ORAL at 09:10

## 2025-01-31 RX ADMIN — HYDROCHLOROTHIAZIDE 25 MG: 25 TABLET ORAL at 09:10

## 2025-01-31 RX ADMIN — SENNOSIDES AND DOCUSATE SODIUM 1 TABLET: 50; 8.6 TABLET ORAL at 09:10

## 2025-01-31 RX ADMIN — LOSARTAN POTASSIUM 25 MG: 25 TABLET, FILM COATED ORAL at 09:10

## 2025-01-31 ASSESSMENT — ACTIVITIES OF DAILY LIVING (ADL)
ADLS_ACUITY_SCORE: 22
ADLS_ACUITY_SCORE: 27
ADLS_ACUITY_SCORE: 22

## 2025-01-31 NOTE — DISCHARGE SUMMARY
Gynecology Progress note          Assessment and Plan:    Assessment: Lyndsay Singh is a 74 year old who is Post-operative day #1 status post Robotic laparoscopic supracervical hysterectomy and bilateral salpingo-oophorectomy  Sacral colpopexy.   Tension-free transvaginal tape placement   Cystoscopy.    Doing well.  Clean wound without signs of infection.  Normal healing wound.  No immediate surgical complications identified.  No excessive bleeding  Pain well-controlled.    Requiring oxygen 1-2 L via nasal cannula to keep O2 sats above 92%.   No oxygen needed today, O2 sats 95-96% on room air   Bps elevated. Hospital medicine consulted for management of comorbidities: plan to restart BP meds today per note. Hydrochlorothiazide scheduled to restart tomorrow per MAR     Plan:   Restart hydrochlorothiazide today  Use incentive spirometer as ordered  Ambulate  Advance activity as tolerated  Pain control measures  Advance diet as tolerated  Discharge today           Interval History:   Doing well.  Continues to improve.  Pain is well-controlled.  No fevers.            Significant Problems:       Cystocele with uterine prolapse 04/22/2024    H/O adenomatous polyp of colon 05/12/2023       Overview Note:       Last scope 2022 w/tubular adenoma.    Essential hypertension, benign (HRC) 05/12/2023    Stiffness of finger joint of right hand 05/28/2021    Hypertrophic burn scar 05/28/2021    Mild intermittent asthma without complication (HRC) 02/23/2016    Family history of malignant neoplasm of gastrointestinal tract 06/07/2007    History of breast cancer 02/15/2007       Overview Note:       2003    S/p elysia mastectomy.  no chemo or radiation              Past Medical History:   Diagnosis Date    Arthritis      Asthma      Breast cancer (HRC)      Hypertension ([facility]C)              Past Surgical History:   Procedure Laterality Date    CHOLECYSTECTOMY        MASTECTOMY Bilateral                 Medications:   All  medications related to the patient's surgery have been reviewed  Current Facility-Administered Medications   Medication Dose Route Frequency Provider Last Rate Last Admin    acetaminophen (TYLENOL) tablet 975 mg  975 mg Oral Q6H Dipika Abdullahi MD        Followed by    [START ON 2/3/2025] acetaminophen (TYLENOL) tablet 650 mg  650 mg Oral Q6H Dipika Abdullahi MD        benzocaine-menthol (CEPACOL) 15-3.6 MG lozenge 1 lozenge  1 lozenge Buccal Q1H PRN Dipika Abdullahi MD        bisacodyl (DULCOLAX) suppository 10 mg  10 mg Rectal Daily PRN Dipika Abdullahi MD        hydrochlorothiazide (HYDRODIURIL) tablet 25 mg  25 mg Oral Daily Moritz, Jessica, APRN CNP        HYDROmorphone (PF) (DILAUDID) injection 0.3 mg  0.3 mg Intravenous Q4H PRN Beth Allen MD        Or    HYDROmorphone (PF) (DILAUDID) injection 0.5 mg  0.5 mg Intravenous Q4H PRN Beth Allen MD        hydrOXYzine HCl (ATARAX) tablet 10 mg  10 mg Oral Q6H PRDipika Min MD        ketorolac (TORADOL) injection 15 mg  15 mg Intravenous Q6H Dipika Abdullahi MD        Or    ibuprofen (ADVIL/MOTRIN) tablet 600 mg  600 mg Oral Q6H Dipika Abdullahi MD        lidocaine (LMX4) cream   Topical Q1H PRN Dipika Abdullahi MD        lidocaine 1 % 0.1-1 mL  0.1-1 mL Other Q1H PRN Dipika Abdullahi MD        loratadine (CLARITIN) tablet 10 mg  10 mg Oral Daily Beth Allen MD        losartan (COZAAR) tablet 25 mg  25 mg Oral Daily Beth Allen MD        magnesium hydroxide (MILK OF MAGNESIA) suspension 30 mL  30 mL Oral Daily PRN Dipika Abdullahi MD        naloxone (NARCAN) injection 0.2 mg  0.2 mg Intravenous Q2 Min PRDipika Min MD        Or    naloxone (NARCAN) injection 0.4 mg  0.4 mg Intravenous Q2 Min PRN Dipika Abdullahi, MD        Or    naloxone (NARCAN) injection 0.2 mg  0.2 mg Intramuscular Q2 Min Dipika Hernandez MD        Or    naloxone (NARCAN)  injection 0.4 mg  0.4 mg Intramuscular Q2 Min PRDipika Min MD        ondansetron (ZOFRAN ODT) ODT tab 4 mg  4 mg Oral Q6H PRN Dipika Abdullahi MD        Or    ondansetron (ZOFRAN) injection 4 mg  4 mg Intravenous Q6H PRDipika Min MD        oxyCODONE (ROXICODONE) tablet 5 mg  5 mg Oral Q4H PRDipika Min MD        Or    oxyCODONE (ROXICODONE) tablet 10 mg  10 mg Oral Q4H PRN Dipika Abdullahi MD        polyethylene glycol (MIRALAX) Packet 17 g  17 g Oral Daily PRDipika Min MD        prochlorperazine (COMPAZINE) injection 5 mg  5 mg Intravenous Q6H PRN Dipika Abdullahi MD        Or    prochlorperazine (COMPAZINE) tablet 5 mg  5 mg Oral Q6H PRN Dipika Abdullahi MD        senna-docusate (SENOKOT-S/PERICOLACE) 8.6-50 MG per tablet 1 tablet  1 tablet Oral BID Dipika Abdullahi MD   1 tablet at 01/30/25 2034    sodium chloride (PF) 0.9% PF flush 3 mL  3 mL Intracatheter Q8H Dipika Abdullahi MD   3 mL at 01/31/25 0503    sodium chloride (PF) 0.9% PF flush 3 mL  3 mL Intracatheter q1 min prn Dipika Abdullahi MD        sodium phosphate (FLEET ENEMA) 1 enema  1 enema Rectal Once PRN Dipika Abdullahi MD                 Physical Exam:   Vitals were reviewed  Patient Vitals for the past 12 hrs:   BP Temp Temp src Pulse Resp SpO2   01/31/25 0508 (!) 149/72 -- -- 99 -- 99 %   01/31/25 0103 (!) 141/65 -- -- 104 -- --   01/30/25 2130 (!) 153/72 98.7  F (37.1  C) Oral 80 16 96 %   01/30/25 2027 (!) 146/70 -- -- 79 15 97 %   01/30/25 2020 -- -- -- -- -- (!) 90 %   01/30/25 1930 (!) 180/77 -- -- 53 14 92 %     I/O last 3 completed shifts:  In: 2600 [I.V.:2600]  Out: 251 [Urine:200; Blood:51]  Wound clean and dry with minimal or no drainage.  Surrounding skin with minimal erythema.          Data:   All laboratory data related to this surgery reviewed  All imaging studies related to this surgery reviewed    Jessica Moritz, APRN CNP          GYN Discharge Summary          Admission Diagnoses:   Female stress incontinence [N39.3]  Uterovaginal prolapse           Discharge Diagnosis:     same          Procedures:     Procedure(s): Robotic laparoscopic supracervical hysterectomy and bilateral salpingo-oophorectomy  Sacral colpopexy.   Tension-free transvaginal tape placement   Cystoscopy.                Medications Prior to Admission:     Medications Prior to Admission   Medication Sig Dispense Refill Last Dose/Taking    hydrochlorothiazide (HYDRODIURIL) 25 MG tablet Take 1 tablet by mouth daily.   1/29/2025 Morning    loratadine (CLARITIN) 10 MG tablet Take 10 mg by mouth daily.   1/30/2025 at  7:30 AM    losartan (COZAAR) 25 MG tablet Take 1 tablet by mouth daily.   1/30/2025 at  7:30 AM    Vitamin D3 (VITAMIN D-1000 MAX ST) 25 mcg (1000 units) tablet Take 1 tablet by mouth daily.   1/16/2025    zinc sulfate (ZINCATE) 220 (50 Zn) MG capsule Take 220 mg by mouth daily.   1/16/2025             Discharge Medications:     Current Discharge Medication List        START taking these medications    Details   acetaminophen (TYLENOL) 325 MG tablet Take 3 tablets (975 mg) by mouth every 6 hours as needed for mild pain.  Qty: 50 tablet, Refills: 0    Associated Diagnoses: H/O: hysterectomy      ibuprofen (ADVIL/MOTRIN) 600 MG tablet Take 1 tablet (600 mg) by mouth every 6 hours as needed for other (mild and/or inflammatory pain).  Qty: 30 tablet, Refills: 0    Associated Diagnoses: H/O: hysterectomy      ondansetron (ZOFRAN ODT) 4 MG ODT tab Take 1-2 tablets (4-8 mg) by mouth every 8 hours as needed for nausea.  Qty: 4 tablet, Refills: 0    Associated Diagnoses: H/O: hysterectomy      oxyCODONE (ROXICODONE) 5 MG tablet Take 1-2 tablets (5-10 mg) by mouth every 4 hours as needed for moderate to severe pain.  Qty: 12 tablet, Refills: 0    Associated Diagnoses: H/O: hysterectomy      senna-docusate (SENOKOT-S/PERICOLACE) 8.6-50 MG tablet Take 1-2 tablets by  mouth 2 times daily.  Qty: 30 tablet, Refills: 0    Associated Diagnoses: H/O: hysterectomy           CONTINUE these medications which have NOT CHANGED    Details   hydrochlorothiazide (HYDRODIURIL) 25 MG tablet Take 1 tablet by mouth daily.      loratadine (CLARITIN) 10 MG tablet Take 10 mg by mouth daily.      losartan (COZAAR) 25 MG tablet Take 1 tablet by mouth daily.      Vitamin D3 (VITAMIN D-1000 MAX ST) 25 mcg (1000 units) tablet Take 1 tablet by mouth daily.      zinc sulfate (ZINCATE) 220 (50 Zn) MG capsule Take 220 mg by mouth daily.                   Consultations:   Hospital medicine for management of home meds and comorbidities           Brief History of Illness:   Lyndsay was admitted for a scheduled procedure            Hospital Course:   The patient's hospital course was unremarkable.  Required some O2 via NC overnight, resolved this morning with IS use and coughing/deep breathing. She recovered as anticipated and experienced no post-operative complications.           Discharge Instructions and Follow-Up:     Discharge diet: Regular   Discharge activity: No heavy lifting for 6 week(s)  No driving or operating machinery while on narcotic analgesics  Pelvic rest: abstain from intercourse and do not use tampons for 6 week(s)   Discharge follow-up: Follow up with Dr. Abdullahi in 1-2 weeks   Wound care Keep wound clean and dry           Discharge Disposition:     Discharged to home      Attestation:  I have reviewed today's vital signs, notes, medications, labs and imaging.  Amount of time performed on this discharge summary: 10 minutes.  Face-to-face time: 10 minutes  Total time: 20 minutes    Jessica Moritz, APRN CNP

## 2025-01-31 NOTE — ANESTHESIA POSTPROCEDURE EVALUATION
Patient: Lyndsay Singh    Procedure: Procedure(s):  ROBOTIC LAPAROSCOPIC SUPRACERVICAL HYSTERECTOMY BILATERAL SALPINGO OOPHORECTOMY, SACROCOLPOPEXY TENSION FREE VAGINAL TAPING, CYSTOSCOPY       Anesthesia Type:  General    Note:  Disposition: Inpatient; Admission   Postop Pain Control: Uneventful            Sign Out: Well controlled pain   PONV: No   Neuro/Psych: Uneventful            Sign Out: Acceptable/Baseline neuro status   Airway/Respiratory: Uneventful            Sign Out: Acceptable/Baseline resp. status   CV/Hemodynamics: Uneventful            Sign Out: Acceptable CV status; No obvious hypovolemia; No obvious fluid overload   Other NRE: NONE   DID A NON-ROUTINE EVENT OCCUR?            Last vitals:  Vitals Value Taken Time   /77 01/30/25 1840   Temp 36.3  C (97.4  F) 01/30/25 1839   Pulse 113 01/30/25 1842   Resp 14 01/30/25 1839   SpO2 84 % 01/30/25 1842   Vitals shown include unfiled device data.    Electronically Signed By: Janelle Watters MD  January 30, 2025  7:57 PM

## 2025-01-31 NOTE — PROGRESS NOTES
"PRIMARY DIAGNOSIS: \"GENERIC\" NURSING  OUTPATIENT/OBSERVATION GOALS TO BE MET BEFORE DISCHARGE:  ADLs back to baseline: Yes    Activity and level of assistance: Up with standby assistance.    Pain status: Pain free.    Return to near baseline physical activity: Yes     Discharge Planner Nurse   Safe discharge environment identified: Yes  Barriers to discharge: No       Entered by: Hanna Carreno RN 01/31/2025 5:48 AM     Please review provider order for any additional goals.   Nurse to notify provider when observation goals have been met and patient is ready for discharge.    Patient is A&O X 4, VSS: on 1-2 L via NC to keep above 92. Denies chest pain, SOB, dizziness, nausea/ vomit. Patient complained of throat discomfort:lozenge given with relief. Tolerating oral intake, voiding spontaneously, passing gas, no BM this shift. SBA to bathroom. PIV saline locked. Bed alarm on, call light within reach. Discharge pending.     "

## 2025-01-31 NOTE — PLAN OF CARE
"PRIMARY DIAGNOSIS: \"GENERIC\" NURSING  OUTPATIENT/OBSERVATION GOALS TO BE MET BEFORE DISCHARGE:  ADLs back to baseline: No    Activity and level of assistance: Up with standby assistance.    Pain status: Pain free.    Return to near baseline physical activity: No     Discharge Planner Nurse   Safe discharge environment identified: Yes  Barriers to discharge: Yes       Entered by: Hanna Carreno RN 01/31/2025 1:26 AM     Please review provider order for any additional goals.   Nurse to notify provider when observation goals have been met and patient is ready for discharge.Goal Outcome Evaluation:                        "

## 2025-01-31 NOTE — PLAN OF CARE
Problem: Adult Inpatient Plan of Care  Goal: Absence of Hospital-Acquired Illness or Injury  Intervention: Identify and Manage Fall Risk  Recent Flowsheet Documentation  Taken 1/31/2025 0915 by Honey Aguilera RN  Safety Promotion/Fall Prevention:   activity supervised   clutter free environment maintained   lighting adjusted   mobility aid in reach   nonskid shoes/slippers when out of bed   patient and family education   room near nurse's station   room organization consistent   safety round/check completed  Intervention: Prevent Skin Injury  Recent Flowsheet Documentation  Taken 1/31/2025 0915 by Honey Aguilera RN  Body Position:   position changed independently   supine, head elevated  Intervention: Prevent and Manage VTE (Venous Thromboembolism) Risk  Recent Flowsheet Documentation  Taken 1/31/2025 0915 by Honey Aguilera RN  VTE Prevention/Management: SCDs on (sequential compression devices)  Intervention: Prevent Infection  Recent Flowsheet Documentation  Taken 1/31/2025 0915 by Honey Aguilera RN  Infection Prevention:   hand hygiene promoted   rest/sleep promoted   single patient room provided     Problem: Delirium  Goal: Improved Behavioral Control  Intervention: Minimize Safety Risk  Recent Flowsheet Documentation  Taken 1/31/2025 0915 by Honey Aguilera RN  Enhanced Safety Measures: room near unit station     Goal Outcome Evaluation:  Pt is A&O, calls appropriately for needs and is a SBA for ambulation. Vitals signs are stable, afebrile, oxygen is on room air. Pt complains of pain 0/10. Pt is tolerating diet. Pt is voiding spontaneously, last bowel movement 1/29/2025.     Plan:  Patient transferred to Discharge [facility] [place] [facility].    Honey Aguilera RN  January 31, 2025, 10:30 AM

## 2025-01-31 NOTE — CONSULTS
[facility] MEDICINE CONSULT NOTE   Physician requesting consult: Dipika Abdullahi MD    Reason for consult: Postoperative medical management of medical co-morbidities as below    Assessment and Plan      74 year female into [facility] on 1/30/2025 after arriving for an elective laparoscopic hysterectomy and BSO along with sacral colpopexy; robotic.  Patient had general anesthesia  With minimal blood loss.    [facility] [place] was asked to evaluate patient for postoperative medical management as follows below. Please resume the home medications as reconciled and further noted below with ordered hold parameters.  Vital signs have been stable post-operatively including hemodynamically stable blood pressure and heart rate. Thank you for this consult; we will continue to follow this patient until discharge.    Problem list:     POD #0 MATILDA:  per OB/gyn team  2.  Hypertension, essential: Resume losartan 25 mg daily tomorrow, hydrochlorothiazide at discharge  3.  Breast cancer history with bilateral mastectomies: noted   3.  DVT prevention: scd  4 Disposition: Medically Ready for Discharge: Anticipated Tomorrow pending post op clinical performance          -Reviewed the patient's preoperative H and P and updated missing elements.  -Home medication reconciliation has been reviewed.  Medications have been ordered as noted from the home list and changes are documented above       Past Medical History     Patient Active Problem List    Diagnosis Date Noted    H/O: hysterectomy 01/30/2025     Priority: Medium        Surgical History   She  has no past surgical history on file.   History reviewed. No pertinent surgical history.    Family History    Reviewed, and family history is not on file.    Social History    Reviewed, and she  reports that she has never smoked. She has never used smokeless tobacco. She reports that she does not drink alcohol and does not use drugs.  Social History     Tobacco Use     Smoking status: Never    Smokeless tobacco: Never   Substance Use Topics    Alcohol use: Never       Allergies     Allergies   Allergen Reactions    Lisinopril Shortness Of Breath and Unknown    Other (Do Not Use) Rash and Blisters     Band-aides    Azithromycin GI Disturbance    Latex Rash and Unknown    Atenolol Other (See Comments) and Unknown       Prior to Admission Medications      Medications Prior to Admission   Medication Sig Dispense Refill Last Dose/Taking    hydrochlorothiazide (HYDRODIURIL) 25 MG tablet Take 1 tablet by mouth daily.   1/29/2025 Morning    loratadine (CLARITIN) 10 MG tablet Take 10 mg by mouth daily.   1/30/2025 at  7:30 AM    losartan (COZAAR) 25 MG tablet Take 1 tablet by mouth daily.   1/30/2025 at  7:30 AM    Vitamin D3 (VITAMIN D-1000 MAX ST) 25 mcg (1000 units) tablet Take 1 tablet by mouth daily.   1/16/2025    zinc sulfate (ZINCATE) 220 (50 Zn) MG capsule Take 220 mg by mouth daily.   1/16/2025       Review of Systems   A 12 point comprehensive review of systems was negative except as noted above.    OBJECTIVE         Physical Exam   Temp:  [96.8  F (36  C)-97.5  F (36.4  C)] 97.2  F (36.2  C)  Pulse:  [53-80] 53  Resp:  [10-16] 14  BP: (120-180)/(59-80) 180/77  SpO2:  [92 %-98 %] 92 %  Body mass index is 38.52 kg/m .    GENERAL:  Alert, oriented, no distress   HEAD:  Normocephalic, without obvious abnormality, atraumatic   EYES:  PERRL, conjunctiva/corneas clear, no scleral icterus, EOM's intact   NOSE: Nares normal, septum midline, mucosa normal, no drainage   THROAT: Lips, mucosa, and tongue normal; teeth and gums normal, mouth moist   NECK: Supple, symmetrical, trachea midline   BACK:   Symmetric, no curvature, ROM normal   LUNGS:   Clear to auscultation bilaterally, no rales, rhonchi, or wheezing, symmetric chest rise on inhalation, respirations unlabored   CHEST WALL:  No tenderness or deformity   HEART:  Regular rate and rhythm, S1 and S2 normal, no murmur, rub, or  gallop    ABDOMEN:   Soft, non-tender, bowel sounds active all four quadrants, no masses, no organomegaly, no rebound or guarding   EXTREMITIES: Extremities normal, atraumatic, no cyanosis or edema    SKIN: Dry to touch, no exanthems in the visualized areas   NEURO: Alert, oriented x 4, moves all four extremities freely/spontaneously   PSYCH: Cooperative, behavior is appropriate         Cardiographics Reviewed Personally By Myself   EKG Results:   Echocardiogram:   Imaging Reviewed Personally By Myself    Radiology Results: No results found for this or any previous visit (from the past 24 hours).    Labs Reviewed Personally By Myself       Preoperative Labs Reviewed Personally By Myself     Thank you for this consultation.  Appreciate the opportunity to participate in the care of Lyndsay Singh, please feel free to contact us for any questions or concerns.    Beth Allen MD  [facility] Medicine  [facility]  Phone: #[phone]

## 2025-02-05 PROCEDURE — 88305 TISSUE EXAM BY PATHOLOGIST: CPT | Mod: 26 | Performed by: PATHOLOGY

## (undated) DEVICE — SOL WATER IRRIG 1000ML BOTTLE 2F7114

## (undated) DEVICE — GLOVE BIOGEL PI ULTRATOUCH G SZ 6.0 42160

## (undated) DEVICE — PREP POVIDONE-IODINE 7.5% SCRUB 4OZ BOTTLE MDS093945

## (undated) DEVICE — Device

## (undated) DEVICE — GLOVE SURG PI ULTRA TOUCH M SZ 7 LF 42670

## (undated) DEVICE — ADAPTER DRAPE ALLY AU-AD

## (undated) DEVICE — DAVINCI XI SEAL UNIVERSAL 5-12MM 470500

## (undated) DEVICE — BRIEF STRETCH XL MPS40

## (undated) DEVICE — MORCELLATOR LAPAROSCOPIC LINA XCISE MOR-1515-6

## (undated) DEVICE — PREP CHLORAPREP 26ML TINTED HI-LITE ORANGE 930815

## (undated) DEVICE — ENDO SHEARS RENEW LAP ENDOCUT SCISSOR TIP 16.5MM 3142

## (undated) DEVICE — GLOVE UNDER INDICATOR PI SZ 6 LF 41660

## (undated) DEVICE — VIAL DECANTER STERILE WHITE DYNJDEC06

## (undated) DEVICE — ANTIFOG SOLUTION SEE SHARP 150M TROCAR SWABS 30978 (COI)

## (undated) DEVICE — DAVINCI XI OBTURATOR BLADELESS 8MM 470359

## (undated) DEVICE — ELECTRODE PATIENT RETURN ADULT L10 FT 2 PLATE CORD 0855C

## (undated) DEVICE — DAVINCI XI DRAPE COLUMN 470341

## (undated) DEVICE — DAVINCI XI DRAPE ARM 470015

## (undated) DEVICE — ENDO TROCAR FIRST ENTRY KII FIOS Z-THRD 05X100MM CTF03

## (undated) DEVICE — SU WND CLOSURE V-LOC 90 SZ 2-0 9" GS-22 VLOCM2145

## (undated) DEVICE — DAVINCI HOT SHEARS TIP COVER  400180

## (undated) DEVICE — SOLUTION IV 2B0304X STRL WATER 1000ML

## (undated) DEVICE — GLOVE PI ULTRATCH M LF SZ 6.5 PF CUFF TEXT STRL LF 42665

## (undated) DEVICE — SU WND CLOSURE VLOC 180 ABS 0 9" GS-21 VLOCL0346

## (undated) DEVICE — SYR 50ML SLIP TIP W/O NDL 309654

## (undated) DEVICE — TUBING IRRIG TUR Y TYPE 96" LF 6543-01

## (undated) DEVICE — NDL INSUFFLATION 13GA 120MM C2201

## (undated) DEVICE — SOL RINGERS LACTATED 1000ML BAG 2B2324X

## (undated) DEVICE — PROTECTOR ARM STANDARD ONE STEP 40433 (COI)

## (undated) DEVICE — SU MONOCRYL+ 4-0 18IN PS2 UND MCP496G

## (undated) DEVICE — CUSTOM PACK DA VINCI GYN SMA5BDVHEA

## (undated) DEVICE — PREP POVIDONE-IODINE 10% SOLUTION 4OZ BOTTLE MDS093944

## (undated) DEVICE — ENDO OBTURATOR ACCESS PORT BLADELESS 8X100MM IAS8-100LP

## (undated) DEVICE — BLADE KNIFE SURG 15 371115

## (undated) DEVICE — TUBING FILTER TRI-LUMEN AIRSEAL ASC-EVAC1

## (undated) DEVICE — DAVINCI XI HANDPIECE ESU VESSEL SEALER 8MM EXT 480422

## (undated) DEVICE — SUTURE VICRYL+ 0 27IN CT-1 UND VCP260H

## (undated) DEVICE — POSITIONING KIT THE PINK PAD XL 40X20X1IN 40583 (COI)

## (undated) DEVICE — SUCTION STRYKERFLOW II 250-070-500

## (undated) DEVICE — SUCTION MANIFOLD NEPTUNE 2 SYS 1 PORT 702-025-000

## (undated) DEVICE — LUBRICANT INST ELECTROLUBE EL101

## (undated) RX ORDER — LIDOCAINE HYDROCHLORIDE 10 MG/ML
INJECTION, SOLUTION EPIDURAL; INFILTRATION; INTRACAUDAL; PERINEURAL
Status: DISPENSED
Start: 2025-01-30

## (undated) RX ORDER — PROPOFOL 10 MG/ML
INJECTION, EMULSION INTRAVENOUS
Status: DISPENSED
Start: 2025-01-30

## (undated) RX ORDER — FENTANYL CITRATE 50 UG/ML
INJECTION, SOLUTION INTRAMUSCULAR; INTRAVENOUS
Status: DISPENSED
Start: 2025-01-30

## (undated) RX ORDER — GLYCOPYRROLATE 0.2 MG/ML
INJECTION, SOLUTION INTRAMUSCULAR; INTRAVENOUS
Status: DISPENSED
Start: 2025-01-30

## (undated) RX ORDER — FUROSEMIDE 10 MG/ML
INJECTION INTRAMUSCULAR; INTRAVENOUS
Status: DISPENSED
Start: 2025-01-30

## (undated) RX ORDER — DEXAMETHASONE SODIUM PHOSPHATE 4 MG/ML
INJECTION, SOLUTION INTRA-ARTICULAR; INTRALESIONAL; INTRAMUSCULAR; INTRAVENOUS; SOFT TISSUE
Status: DISPENSED
Start: 2025-01-30

## (undated) RX ORDER — ONDANSETRON 2 MG/ML
INJECTION INTRAMUSCULAR; INTRAVENOUS
Status: DISPENSED
Start: 2025-01-30